# Patient Record
Sex: MALE | Race: WHITE | Employment: UNEMPLOYED | URBAN - METROPOLITAN AREA
[De-identification: names, ages, dates, MRNs, and addresses within clinical notes are randomized per-mention and may not be internally consistent; named-entity substitution may affect disease eponyms.]

---

## 2023-03-13 ENCOUNTER — HOSPITAL ENCOUNTER (OUTPATIENT)
Age: 63
Discharge: HOME | End: 2023-03-13

## 2023-03-13 VITALS
SYSTOLIC BLOOD PRESSURE: 155 MMHG | OXYGEN SATURATION: 99 % | DIASTOLIC BLOOD PRESSURE: 79 MMHG | HEART RATE: 73 BPM | HEIGHT: 68 IN | WEIGHT: 170 LBS | TEMPERATURE: 98 F | BODY MASS INDEX: 25.76 KG/M2 | RESPIRATION RATE: 20 BRPM

## 2023-03-13 DIAGNOSIS — Z48.02 ENCOUNTER FOR REMOVAL OF SUTURES: Primary | ICD-10-CM

## 2023-03-13 PROCEDURE — 99202 OFFICE O/P NEW SF 15 MIN: CPT | Performed by: NURSE PRACTITIONER

## 2023-03-13 NOTE — ED INITIAL ASSESSMENT (HPI)
2/25/23- pt had a face lift in [facility]. Pt has sutures behind both ears.   Pt is here for suture removal.

## 2023-03-13 NOTE — ED PROVIDER NOTES
Patient presents with:  Suture Removal: stitches behind ear - Entered by patient      HPI:     Hollis Hernandez is a 59year old male presents for suture removal removal. Injury occurred 3 weeks ago. The patient denies wound problems or concerns. The patient had a facelift done in [facility] approximately 3 weeks ago. He has 4 continuous sutures behind the ear, scalp, and down towards the chin. He states prior to returning from [facility], the surgeon was unable to take out the sutures because they were not ready. He denies any complaints or concerns. Family history reviewed with patient/caregiver and is not pertinent to presenting problem. Social History    Socioeconomic History      Marital status:       Spouse name: Not on file      Number of children: Not on file      Years of education: Not on file      Highest education level: Not on file    Occupational History      Not on file    Tobacco Use      Smoking status: Not on file      Smokeless tobacco: Not on file    Vaping Use      Vaping status: Not on file    Substance and Sexual Activity      Alcohol use: Not on file      Drug use: Not on file      Sexual activity: Not on file    Other Topics      Concerns:        Not on file    Social History Narrative      Not on file    Social Determinants of Health  Financial Resource Strain: Not on file  Food Insecurity: Not on file  Transportation Needs: Not on file  Physical Activity: Not on file  Stress: Not on file  Social Connections: Not on file  Housing Stability: Not on file      ROS:     Positive for stated complaint: suture removal, healing wounds  All other systems reviewed and negative except as noted above. Constitutional and Vital Signs Reviewed. Physical Exam:     Wounds are healing adequately. There are not signs of infection. suture removal completed without difficulty.     Steri-strips placed:  no    MDM/Assessment/Plan:   Orders for this encounter:  No orders of the defined types were placed in this encounter. Labs performed this visit:  No results found for any previous visit. MDM:  For continuous sutures were removed from behind the ear, scalp, and down towards the chin. There are no signs of infection present. The patient will follow-up with his doctor as needed. Diagnosis:    ICD-10-CM    1.  Encounter for removal of sutures  Z48.02